# Patient Record
Sex: MALE | Race: BLACK OR AFRICAN AMERICAN
[De-identification: names, ages, dates, MRNs, and addresses within clinical notes are randomized per-mention and may not be internally consistent; named-entity substitution may affect disease eponyms.]

---

## 2020-02-25 ENCOUNTER — HOSPITAL ENCOUNTER (OUTPATIENT)
Dept: HOSPITAL 61 - KCIC | Age: 50
Discharge: HOME | End: 2020-02-25
Attending: FAMILY MEDICINE
Payer: COMMERCIAL

## 2020-02-25 DIAGNOSIS — M89.38: ICD-10-CM

## 2020-02-25 DIAGNOSIS — M48.061: ICD-10-CM

## 2020-02-25 DIAGNOSIS — M47.816: Primary | ICD-10-CM

## 2020-02-25 PROCEDURE — 72110 X-RAY EXAM L-2 SPINE 4/>VWS: CPT

## 2020-02-26 NOTE — [facility]
Five-view lumbar spine dated 2/25/2020.

 

No comparison available.

 

CLINICAL INDICATION: Bilateral low back pain.

 

FINDINGS:

 

AP, lateral, bilateral oblique views obtained. Sagittal alignment is 

anatomic. Vertebral body heights are maintained. There is mild 

dextroconvex curvature. Mild endplate hypertrophic changes throughout with

mild multilevel disc space narrowing and moderate arthrosis lower lumbar 

apophyseal joints. No apparent pars defects on the oblique views.

 

IMPRESSION:

1. No acute radiographic abnormality.

2. Mild multilevel spondylosis.

 

Electronically signed by: Yoni Sparks MD (2/26/2020 2:44 AM) 

[id number]

## 2021-06-14 ENCOUNTER — HOSPITAL ENCOUNTER (OUTPATIENT)
Dept: HOSPITAL 61 - KCIC | Age: 51
End: 2021-06-14
Attending: FAMILY MEDICINE
Payer: COMMERCIAL

## 2021-06-14 DIAGNOSIS — M17.12: Primary | ICD-10-CM

## 2021-06-14 DIAGNOSIS — M19.022: ICD-10-CM

## 2021-06-14 DIAGNOSIS — M25.462: ICD-10-CM

## 2021-06-14 DIAGNOSIS — M25.762: ICD-10-CM

## 2021-06-14 PROCEDURE — 73562 X-RAY EXAM OF KNEE 3: CPT

## 2021-06-14 PROCEDURE — 73080 X-RAY EXAM OF ELBOW: CPT

## 2021-06-14 NOTE — [facility]
EXAM: 3 views of the left elbow



DATE: 6/14/2021 10:00 AM



INDICATION: Reason: Elbow pain, limited ROM, unable to fully extend arm. / Spl. Instructions:  / Hist
ory: 



COMPARISON: No Prior



FINDINGS:

No elbow joint effusion. No acute fracture or dislocation. Degenerative changes of the left elbow wit
h small osteophytes. Small joint bodies of the posterior joint line. Small olecranon enthesophyte. No
 significant soft tissue swelling.



IMPRESSION: 

1.  No acute fracture or dislocation.

2.  Left elbow joint osteoarthritis.

3.  Small joint bodies are seen in the olecranon fossa.



Electronically signed by: Bob Chandra MD (6/14/2021 10:49 AM) [facility]

## 2021-06-14 NOTE — [facility]
EXAM: AP, lateral and oblique views of the left knee



DATE: 6/14/2021 10:00 AM



CLINICAL INDICATION: Reason: Left knee pain and swelling.  No known injury. / Spl. Instructions:  / H
istory: 



COMPARISON: None. 



FINDINGS:

 Negative for acute or subacute fracture. Regional joint spaces are preserved although tricompartment
al osteophytes are seen. Negative focal soft tissue swelling. Negative retained radiopaque foreign charles
dy. There is a small left knee joint effusion. Small suprapatellar enthesophyte.



IMPRESSION:

1.  No evidence of acute fracture or dislocation.

2.  Mild left knee joint osteoarthritis with small associated osteophytes.

3.  Prominent suprapatellar enthesophyte.

4.  There is a small left knee joint effusion.



Electronically signed by: Bob Chandra MD (6/14/2021 10:50 AM) [facility]